# Patient Record
Sex: MALE | Race: BLACK OR AFRICAN AMERICAN | NOT HISPANIC OR LATINO | URBAN - METROPOLITAN AREA
[De-identification: names, ages, dates, MRNs, and addresses within clinical notes are randomized per-mention and may not be internally consistent; named-entity substitution may affect disease eponyms.]

---

## 2024-07-22 RX ORDER — BACTERIOSTATIC SODIUM CHLORIDE 0.9 %
3 VIAL (ML) INJECTION EVERY 8 HOURS
Refills: 0 | Status: DISCONTINUED | OUTPATIENT
Start: 2024-08-06 | End: 2024-08-08

## 2024-07-22 NOTE — CHART NOTE - NSCHARTNOTEFT_GEN_A_CORE
PRE-TJR SOCIAL/FUNCTIONAL SCREENING Via:     In Person Meet  on 7/22/2024:  Preferred Language:  English  Patient Telephone #:  [phone]  EMAIL ADDRESS:  [email or web address]  Insurance:  [employer]  Pt stated Goal for Surgery : Ride my bicycle  SURGERY DETAILS:  Sx Date:  8/6/2024                                                                                           Surgery Type:  Right Hip Replacement  Surgeon:  Dr. Decker     SOCIAL HISTORY:  Live With:   Spouse in a multifamily home on the second floor    Stairs Required to Access (Street to Front Door) Residence:    Yes; 24 with railing on the right side going up  Once Inside Pt Residence:   To Access a Bathroom:      No Stairs Required    To Access a Bedroom Where Pt. Can Sleep:  No Stairs Required      Pt. Currently Has Formal Home Health Services:  No     MOBILITY HISTORY:   Baseline Ambulation- Pt is Independent in ambulation with assistive device:     Yes;   Cane  Pt. Owns Any Other Medical Equipment?  No, patient will need rolling walker and 3-in-1 commode, however MD office ordered the equipment and it should be delivered prior to surgery    MEDICAL HISTORY:  Pt has any History of Back Surgery: No  Pt has any Allergies:  No     Patient denies diagnosis of sleep apnea or use of CPAP    Pt. Pharmacy Information:  Name:  [facility]                  Address:     [address] [place] [address]     Telephone #:  [phone]    Pt. will Require Transportation to Home Upon Discharge:  Yes; Patient is having a hip replacement and will need to maintain hip precautions.   will be Made Aware:    For Post-Acute Care:  Pt. prefers [facility] for post-acute needs    Pt electronically sent pre- op education book "What to do before and after hip replacement".  All details of upcoming procedure discussed including, precautions, the throughput process, post-op process including transportation, home-care and follow-up as well as important information regarding blood clots, pneumonia, falls, infection and pain. Patient given a hip kit including devices to help maintain proper hip precautions (i.e. Long shoe horn, sock aid, reacher, long handled scrub brush)  All questions answered and pt. verbalized understanding.  Pt. has my phone number for follow up as needed.

## 2024-08-06 ENCOUNTER — INPATIENT (INPATIENT)
Facility: HOSPITAL | Age: 64
LOS: 1 days | Discharge: HOME HEALTH | DRG: 554 | End: 2024-08-08
Attending: ORTHOPAEDIC SURGERY | Admitting: ORTHOPAEDIC SURGERY
Payer: MEDICAID

## 2024-08-06 VITALS
WEIGHT: 173.94 LBS | OXYGEN SATURATION: 98 % | RESPIRATION RATE: 16 BRPM | DIASTOLIC BLOOD PRESSURE: 66 MMHG | HEART RATE: 73 BPM | HEIGHT: 65 IN | SYSTOLIC BLOOD PRESSURE: 127 MMHG | TEMPERATURE: 98 F

## 2024-08-06 DIAGNOSIS — M16.11 UNILATERAL PRIMARY OSTEOARTHRITIS, RIGHT HIP: ICD-10-CM

## 2024-08-06 LAB
ANION GAP SERPL CALC-SCNC: 6 MMOL/L — SIGNIFICANT CHANGE UP (ref 5–17)
BLD GP AB SCN SERPL QL: SIGNIFICANT CHANGE UP
BUN SERPL-MCNC: 14 MG/DL — SIGNIFICANT CHANGE UP (ref 7–18)
CALCIUM SERPL-MCNC: 8.3 MG/DL — LOW (ref 8.4–10.5)
CHLORIDE SERPL-SCNC: 107 MMOL/L — SIGNIFICANT CHANGE UP (ref 96–108)
CO2 SERPL-SCNC: 25 MMOL/L — SIGNIFICANT CHANGE UP (ref 22–31)
CREAT SERPL-MCNC: 0.91 MG/DL — SIGNIFICANT CHANGE UP (ref 0.5–1.3)
EGFR: 94 ML/MIN/1.73M2 — SIGNIFICANT CHANGE UP
GLUCOSE BLDC GLUCOMTR-MCNC: 88 MG/DL — SIGNIFICANT CHANGE UP (ref 70–99)
GLUCOSE BLDC GLUCOMTR-MCNC: 97 MG/DL — SIGNIFICANT CHANGE UP (ref 70–99)
GLUCOSE SERPL-MCNC: 91 MG/DL — SIGNIFICANT CHANGE UP (ref 70–99)
HCT VFR BLD CALC: 38.9 % — LOW (ref 39–50)
HGB BLD-MCNC: 12.8 G/DL — LOW (ref 13–17)
MCHC RBC-ENTMCNC: 28.6 PG — SIGNIFICANT CHANGE UP (ref 27–34)
MCHC RBC-ENTMCNC: 32.9 GM/DL — SIGNIFICANT CHANGE UP (ref 32–36)
MCV RBC AUTO: 87 FL — SIGNIFICANT CHANGE UP (ref 80–100)
NRBC # BLD: 0 /100 WBCS — SIGNIFICANT CHANGE UP (ref 0–0)
PLATELET # BLD AUTO: 241 K/UL — SIGNIFICANT CHANGE UP (ref 150–400)
POTASSIUM SERPL-MCNC: 3.9 MMOL/L — SIGNIFICANT CHANGE UP (ref 3.5–5.3)
POTASSIUM SERPL-SCNC: 3.9 MMOL/L — SIGNIFICANT CHANGE UP (ref 3.5–5.3)
RBC # BLD: 4.47 M/UL — SIGNIFICANT CHANGE UP (ref 4.2–5.8)
RBC # FLD: 13.6 % — SIGNIFICANT CHANGE UP (ref 10.3–14.5)
SODIUM SERPL-SCNC: 138 MMOL/L — SIGNIFICANT CHANGE UP (ref 135–145)
WBC # BLD: 9.41 K/UL — SIGNIFICANT CHANGE UP (ref 3.8–10.5)
WBC # FLD AUTO: 9.41 K/UL — SIGNIFICANT CHANGE UP (ref 3.8–10.5)

## 2024-08-06 PROCEDURE — 72170 X-RAY EXAM OF PELVIS: CPT | Mod: 26

## 2024-08-06 PROCEDURE — 27130 TOTAL HIP ARTHROPLASTY: CPT | Mod: AS,RT

## 2024-08-06 PROCEDURE — 27066 REMOVE HIP BONE LES DEEP: CPT | Mod: AS,59,RT

## 2024-08-06 PROCEDURE — 88311 DECALCIFY TISSUE: CPT | Mod: 26

## 2024-08-06 PROCEDURE — 64712 REVISION OF SCIATIC NERVE: CPT | Mod: AS,59,RT

## 2024-08-06 PROCEDURE — 88305 TISSUE EXAM BY PATHOLOGIST: CPT | Mod: 26

## 2024-08-06 DEVICE — LINER ACET TRIDENT X3 10 DEG 36MM E: Type: IMPLANTABLE DEVICE | Status: FUNCTIONAL

## 2024-08-06 DEVICE — CERAMIC HEAD: Type: IMPLANTABLE DEVICE | Status: FUNCTIONAL

## 2024-08-06 DEVICE — CABLE/SLV SET BEAD MED CABLE 2MM: Type: IMPLANTABLE DEVICE | Status: FUNCTIONAL

## 2024-08-06 DEVICE — IMPLANTABLE DEVICE: Type: IMPLANTABLE DEVICE | Status: FUNCTIONAL

## 2024-08-06 DEVICE — SHELL ACET TRIDENT II PLUS CLUSTERHOLE HA 52MM A: Type: IMPLANTABLE DEVICE | Status: FUNCTIONAL

## 2024-08-06 RX ORDER — CHLORHEXIDINE GLUCONATE 500 MG/1
1 CLOTH TOPICAL ONCE
Refills: 0 | Status: COMPLETED | OUTPATIENT
Start: 2024-08-06 | End: 2024-08-06

## 2024-08-06 RX ORDER — CELECOXIB 200 MG/1
200 CAPSULE ORAL ONCE
Refills: 0 | Status: COMPLETED | OUTPATIENT
Start: 2024-08-06 | End: 2024-08-06

## 2024-08-06 RX ORDER — GABAPENTIN 400 MG/1
1 CAPSULE ORAL
Qty: 0 | Refills: 0 | DISCHARGE

## 2024-08-06 RX ORDER — SENNOSIDES 8.6 MG/1
2 TABLET ORAL AT BEDTIME
Refills: 0 | Status: DISCONTINUED | OUTPATIENT
Start: 2024-08-06 | End: 2024-08-08

## 2024-08-06 RX ORDER — OXYCODONE HYDROCHLORIDE 30 MG/1
5 TABLET ORAL EVERY 4 HOURS
Refills: 0 | Status: DISCONTINUED | OUTPATIENT
Start: 2024-08-06 | End: 2024-08-08

## 2024-08-06 RX ORDER — BACTERIOSTATIC SODIUM CHLORIDE 0.9 %
1000 VIAL (ML) INJECTION
Refills: 0 | Status: DISCONTINUED | OUTPATIENT
Start: 2024-08-06 | End: 2024-08-08

## 2024-08-06 RX ORDER — LORATADINE 10 MG
17 TABLET,DISINTEGRATING ORAL AT BEDTIME
Refills: 0 | Status: DISCONTINUED | OUTPATIENT
Start: 2024-08-06 | End: 2024-08-08

## 2024-08-06 RX ORDER — KETOROLAC TROMETHAMINE 10 MG
15 TABLET ORAL EVERY 6 HOURS
Refills: 0 | Status: DISCONTINUED | OUTPATIENT
Start: 2024-08-06 | End: 2024-08-08

## 2024-08-06 RX ORDER — LORATADINE 10 MG
17 TABLET,DISINTEGRATING ORAL
Qty: 0 | Refills: 0 | DISCHARGE

## 2024-08-06 RX ORDER — OXYCODONE AND ACETAMINOPHEN 10; 325 MG/1; MG/1
1 TABLET ORAL
Qty: 0 | Refills: 0 | DISCHARGE

## 2024-08-06 RX ORDER — MAGNESIUM HYDROXIDE 400 MG/5ML
30 SUSPENSION, ORAL (FINAL DOSE FORM) ORAL DAILY
Refills: 0 | Status: DISCONTINUED | OUTPATIENT
Start: 2024-08-06 | End: 2024-08-08

## 2024-08-06 RX ORDER — ACETAMINOPHEN 500 MG
975 TABLET ORAL ONCE
Refills: 0 | Status: COMPLETED | OUTPATIENT
Start: 2024-08-06 | End: 2024-08-06

## 2024-08-06 RX ORDER — ONDANSETRON HCL/PF 4 MG/2 ML
4 VIAL (ML) INJECTION EVERY 6 HOURS
Refills: 0 | Status: DISCONTINUED | OUTPATIENT
Start: 2024-08-06 | End: 2024-08-08

## 2024-08-06 RX ORDER — RIVAROXABAN 15 MG-20MG
1 KIT ORAL
Qty: 0 | Refills: 0 | DISCHARGE

## 2024-08-06 RX ORDER — ENOXAPARIN SODIUM 120 MG/.8ML
30 INJECTION SUBCUTANEOUS EVERY 12 HOURS
Refills: 0 | Status: DISCONTINUED | OUTPATIENT
Start: 2024-08-07 | End: 2024-08-08

## 2024-08-06 RX ORDER — METOCLOPRAMIDE HCL 5 MG/ML
10 VIAL (ML) INJECTION ONCE
Refills: 0 | Status: DISCONTINUED | OUTPATIENT
Start: 2024-08-06 | End: 2024-08-06

## 2024-08-06 RX ORDER — TRAMADOL HCL 50 MG
50 TABLET ORAL EVERY 8 HOURS
Refills: 0 | Status: DISCONTINUED | OUTPATIENT
Start: 2024-08-06 | End: 2024-08-08

## 2024-08-06 RX ORDER — FENTANYL CITRATE 1200 UG/1
25 LOZENGE ORAL; TRANSMUCOSAL
Refills: 0 | Status: DISCONTINUED | OUTPATIENT
Start: 2024-08-06 | End: 2024-08-06

## 2024-08-06 RX ORDER — SENNOSIDES 8.6 MG/1
1 TABLET ORAL
Qty: 0 | Refills: 0 | DISCHARGE

## 2024-08-06 RX ORDER — PANTOPRAZOLE SODIUM 20 MG/1
1 TABLET, DELAYED RELEASE ORAL
Qty: 0 | Refills: 0 | DISCHARGE

## 2024-08-06 RX ORDER — FENTANYL CITRATE 1200 UG/1
50 LOZENGE ORAL; TRANSMUCOSAL
Refills: 0 | Status: DISCONTINUED | OUTPATIENT
Start: 2024-08-06 | End: 2024-08-06

## 2024-08-06 RX ORDER — ROSUVASTATIN CALCIUM 10 MG
1 TABLET ORAL
Refills: 0 | DISCHARGE

## 2024-08-06 RX ORDER — CEFAZOLIN SODIUM 10 G
2000 VIAL (EA) INJECTION EVERY 8 HOURS
Refills: 0 | Status: COMPLETED | OUTPATIENT
Start: 2024-08-06 | End: 2024-08-07

## 2024-08-06 RX ORDER — ACETAMINOPHEN 500 MG
1000 TABLET ORAL EVERY 6 HOURS
Refills: 0 | Status: DISCONTINUED | OUTPATIENT
Start: 2024-08-06 | End: 2024-08-08

## 2024-08-06 RX ORDER — CELECOXIB 200 MG/1
200 CAPSULE ORAL DAILY
Refills: 0 | Status: DISCONTINUED | OUTPATIENT
Start: 2024-08-07 | End: 2024-08-08

## 2024-08-06 RX ORDER — CELECOXIB 200 MG/1
1 CAPSULE ORAL
Qty: 0 | Refills: 0 | DISCHARGE

## 2024-08-06 RX ORDER — ASPIRIN 500 MG
1 TABLET ORAL
Qty: 0 | Refills: 0 | DISCHARGE

## 2024-08-06 RX ORDER — TRAMADOL HCL 50 MG
50 TABLET ORAL ONCE
Refills: 0 | Status: DISCONTINUED | OUTPATIENT
Start: 2024-08-06 | End: 2024-08-06

## 2024-08-06 RX ADMIN — Medication 15 MILLIGRAM(S): at 23:01

## 2024-08-06 RX ADMIN — CHLORHEXIDINE GLUCONATE 1 APPLICATION(S): 500 CLOTH TOPICAL at 11:37

## 2024-08-06 RX ADMIN — Medication 50 MILLIGRAM(S): at 11:37

## 2024-08-06 RX ADMIN — Medication 1000 MILLIGRAM(S): at 21:25

## 2024-08-06 RX ADMIN — Medication 1000 MILLIGRAM(S): at 22:25

## 2024-08-06 RX ADMIN — Medication 50 MILLIGRAM(S): at 22:25

## 2024-08-06 RX ADMIN — Medication 100 MILLIGRAM(S): at 23:16

## 2024-08-06 RX ADMIN — Medication 17 GRAM(S): at 21:26

## 2024-08-06 RX ADMIN — Medication 3 MILLILITER(S): at 21:35

## 2024-08-06 RX ADMIN — SENNOSIDES 2 TABLET(S): 8.6 TABLET ORAL at 21:25

## 2024-08-06 RX ADMIN — Medication 50 MILLIGRAM(S): at 21:25

## 2024-08-06 RX ADMIN — Medication 15 MILLIGRAM(S): at 23:20

## 2024-08-06 RX ADMIN — Medication 975 MILLIGRAM(S): at 11:37

## 2024-08-06 RX ADMIN — CELECOXIB 200 MILLIGRAM(S): 200 CAPSULE ORAL at 11:37

## 2024-08-06 NOTE — DISCHARGE NOTE PROVIDER - NSDCMRMEDTOKEN_GEN_ALL_CORE_FT
aspirin 325 mg oral tablet: 1 tab(s) orally every 12 hours Take after you complete 12 days of Xarelto  CeleBREX 200 mg oral capsule: 1 cap(s) orally once a day  ferrous fumarate 325 mg (106 mg elemental iron) oral tablet: 1 tab(s) orally every 12 hours  gabapentin 100 mg oral tablet: 1 tab(s) orally every 12 hours  meloxicam 7.5 mg oral tablet: 1 tab(s) orally once a day as needed for  severe pain  metFORMIN 500 mg oral tablet: 1 tab(s) orally once a day  pantoprazole 40 mg oral delayed release tablet: 1 tab(s) orally once a day  Percocet 7.5/325 oral tablet: 1 tab(s) orally every 6 hours as needed for  severe pain  polyethylene glycol 3350 oral powder for reconstitution: 17 gram(s) orally once a day as needed for  constipation  rosuvastatin 20 mg oral capsule: 1 cap(s) orally once a day  senna (sennosides) 8.6 mg oral tablet: 1 tab(s) orally every 12 hours as needed for  constipation  Vitamin D3 50,000 units PO once a week: patient has never started to take it  Xarelto 10 mg oral tablet: 1 tab(s) orally once a day   aspirin 325 mg oral tablet: 1 tab(s) orally every 12 hours Take after you complete 12 days of Xarelto  CeleBREX 200 mg oral capsule: 1 cap(s) orally once a day  ferrous fumarate 325 mg (106 mg elemental iron) oral tablet: 1 tab(s) orally every 12 hours  gabapentin 100 mg oral tablet: 1 tab(s) orally every 12 hours  metFORMIN 500 mg oral tablet: 1 tab(s) orally once a day  pantoprazole 40 mg oral delayed release tablet: 1 tab(s) orally once a day  Percocet 7.5/325 oral tablet: 1 tab(s) orally every 6 hours as needed for  severe pain  polyethylene glycol 3350 oral powder for reconstitution: 17 gram(s) orally once a day as needed for  constipation  rosuvastatin 20 mg oral capsule: 1 cap(s) orally once a day  senna (sennosides) 8.6 mg oral tablet: 1 tab(s) orally every 12 hours as needed for  constipation  Vitamin D3 50,000 units PO once a week: patient has never started to take it  Xarelto 10 mg oral tablet: 1 tab(s) orally once a day

## 2024-08-06 NOTE — DISCHARGE NOTE PROVIDER - HOSPITAL COURSE
Patient is a 63y/o M s/p Right total hip replacement. Patient with no acute post op complications. Patient received physical therapy and pain management throughout hospital stay.

## 2024-08-06 NOTE — DISCHARGE NOTE PROVIDER - CARE PROVIDER_API CALL
Renan Decker  Orthopaedic Surgery  [address] [place] [address], [address] [place] [address]  Phone: [phone]  Fax: [phone]  Follow Up Time: 2 weeks

## 2024-08-06 NOTE — DISCHARGE NOTE PROVIDER - NSDCCPTREATMENT_GEN_ALL_CORE_FT
PRINCIPAL PROCEDURE  Procedure: Right hip replacement  Findings and Treatment: Pain Management- *See Attached Medication Reconciliation  *Posterior Hip Precautions for Total hip replacement***  ** >Keep your toes pointing forward or slightly out. Don't rotate your leg too far to the inside.              >Do not bend your hip more than 90 degrees.              >Keep your knees apart. Don't cross your legs.              >Don't lift your knee higher than your hip.              >Don't sit on low chairs, beds, or toilets.   Weight Bearing Status:  WBAT to RLE with walker  Equipment needs: Commode, Walker  Dressing: Please keep bandage/dressing Clean, Dry, and Intact.  Mepilex dressing to be removed only if not in good condition or when staples are to be removed. If mepilex dressing is violated, change with dressing every 2- 3 days with 4x4, abd pads, and ACE bandage.   Dvt prophylaxis: Take Xarelto 10 mg for 12 days then start Aspirin 325mg BID for 15 days  Incision site: NURSING to remove staples on 08/21/24  PT/Occupational Therapy are Activities of Daily Living as appropriate  Follow up with Dr. Decker in 2 WEEKS at [phone] after STAPLES ARE REMOVED   If patient has drainage from the wound, please contact the surgeon's office.   If patient has intractable pain, please contact the surgeon's office.   If excessively warm at the incision site is noted, please contact the surgeon's office.   If redness is noted, especially spreading, please contact the surgeon's office.   If patient has fever over 101F please return to the hospital through the Emergency Room.   If tita blood is saturating the dressing, please return to the hospital through the Emergency Room.  If drainage of fluid or pus is noted, please return to the hospital through the Emergency Room.

## 2024-08-06 NOTE — PROCEDURAL SAFETY CHECKLIST WITH OR WITHOUT SEDATION - NSRESOLVEDISCREP_GEN_ALL_CORE
Continue present medications.    Remain active as tolerated and monitor blood pressure regularly.  · Orthostatic blood pressure assessment in our office today was negative  · Please take BP PRIOR to any medication in AM for the next 2 days - call with results  · I am considering resuming carvedilol at a lesser dose of 3.125 mg twice daily     Call us at [phone], if:   Instructions are unclear  or   You have any questions/ concerns  or    1 week after tests, if you have not received your test results.    Follow up in 4-6 weeks or sooner if needed.      
done

## 2024-08-06 NOTE — PATIENT PROFILE ADULT - FALL HARM RISK - HARM RISK INTERVENTIONS

## 2024-08-06 NOTE — DISCHARGE NOTE PROVIDER - NSDCCPCAREPLAN_GEN_ALL_CORE_FT
PRINCIPAL DISCHARGE DIAGNOSIS  Diagnosis: Unilateral primary osteoarthritis, right hip  Assessment and Plan of Treatment: Pain Management- *See Attached Medication Reconciliation  *Posterior Hip Precautions for Total hip replacement***  ** >Keep your toes pointing forward or slightly out. Don't rotate your leg too far to the inside.              >Do not bend your hip more than 90 degrees.              >Keep your knees apart. Don't cross your legs.              >Don't lift your knee higher than your hip.              >Don't sit on low chairs, beds, or toilets.   Weight Bearing Status:  WBAT to RLE with walker  Equipment needs: Commode, Walker  Dressing: Please keep bandage/dressing Clean, Dry, and Intact.  Mepilex dressing to be removed only if not in good condition or when staples are to be removed. If mepilex dressing is violated, change with dressing every 2- 3 days with 4x4, abd pads, and ACE bandage.   Dvt prophylaxis: Take Xarelto 10 mg for 12 days then start Aspirin 325mg BID for 15 days  Incision site: NURSING to remove staples on 08/21/24  PT/Occupational Therapy are Activities of Daily Living as appropriate  Follow up with Dr. Decker in 2 WEEKS at [phone] after BETSY ARE REMOVED

## 2024-08-07 LAB
ANION GAP SERPL CALC-SCNC: 7 MMOL/L — SIGNIFICANT CHANGE UP (ref 5–17)
BUN SERPL-MCNC: 18 MG/DL — SIGNIFICANT CHANGE UP (ref 7–18)
CALCIUM SERPL-MCNC: 8 MG/DL — LOW (ref 8.4–10.5)
CHLORIDE SERPL-SCNC: 106 MMOL/L — SIGNIFICANT CHANGE UP (ref 96–108)
CO2 SERPL-SCNC: 24 MMOL/L — SIGNIFICANT CHANGE UP (ref 22–31)
CREAT SERPL-MCNC: 0.85 MG/DL — SIGNIFICANT CHANGE UP (ref 0.5–1.3)
EGFR: 97 ML/MIN/1.73M2 — SIGNIFICANT CHANGE UP
GLUCOSE SERPL-MCNC: 117 MG/DL — HIGH (ref 70–99)
HCT VFR BLD CALC: 34.5 % — LOW (ref 39–50)
HGB BLD-MCNC: 11.6 G/DL — LOW (ref 13–17)
MCHC RBC-ENTMCNC: 29 PG — SIGNIFICANT CHANGE UP (ref 27–34)
MCHC RBC-ENTMCNC: 33.6 GM/DL — SIGNIFICANT CHANGE UP (ref 32–36)
MCV RBC AUTO: 86.3 FL — SIGNIFICANT CHANGE UP (ref 80–100)
NRBC # BLD: 0 /100 WBCS — SIGNIFICANT CHANGE UP (ref 0–0)
PLATELET # BLD AUTO: 220 K/UL — SIGNIFICANT CHANGE UP (ref 150–400)
POTASSIUM SERPL-MCNC: 3.8 MMOL/L — SIGNIFICANT CHANGE UP (ref 3.5–5.3)
POTASSIUM SERPL-SCNC: 3.8 MMOL/L — SIGNIFICANT CHANGE UP (ref 3.5–5.3)
RBC # BLD: 4 M/UL — LOW (ref 4.2–5.8)
RBC # FLD: 13.6 % — SIGNIFICANT CHANGE UP (ref 10.3–14.5)
SODIUM SERPL-SCNC: 137 MMOL/L — SIGNIFICANT CHANGE UP (ref 135–145)
WBC # BLD: 9.54 K/UL — SIGNIFICANT CHANGE UP (ref 3.8–10.5)
WBC # FLD AUTO: 9.54 K/UL — SIGNIFICANT CHANGE UP (ref 3.8–10.5)

## 2024-08-07 RX ADMIN — Medication 50 MILLIGRAM(S): at 05:08

## 2024-08-07 RX ADMIN — Medication 1000 MILLIGRAM(S): at 05:08

## 2024-08-07 RX ADMIN — SENNOSIDES 2 TABLET(S): 8.6 TABLET ORAL at 21:16

## 2024-08-07 RX ADMIN — Medication 15 MILLIGRAM(S): at 12:30

## 2024-08-07 RX ADMIN — Medication 50 MILLIGRAM(S): at 22:17

## 2024-08-07 RX ADMIN — Medication 50 MILLIGRAM(S): at 06:08

## 2024-08-07 RX ADMIN — Medication 3 MILLILITER(S): at 21:52

## 2024-08-07 RX ADMIN — Medication 3 MILLILITER(S): at 05:43

## 2024-08-07 RX ADMIN — Medication 1000 MILLIGRAM(S): at 11:59

## 2024-08-07 RX ADMIN — Medication 100 MILLIGRAM(S): at 06:23

## 2024-08-07 RX ADMIN — Medication 50 MILLIGRAM(S): at 21:17

## 2024-08-07 RX ADMIN — Medication 17 GRAM(S): at 21:16

## 2024-08-07 RX ADMIN — CELECOXIB 200 MILLIGRAM(S): 200 CAPSULE ORAL at 12:30

## 2024-08-07 RX ADMIN — CELECOXIB 200 MILLIGRAM(S): 200 CAPSULE ORAL at 11:59

## 2024-08-07 RX ADMIN — Medication 50 MILLIGRAM(S): at 17:22

## 2024-08-07 RX ADMIN — Medication 3 MILLILITER(S): at 13:43

## 2024-08-07 RX ADMIN — Medication 4 MILLIGRAM(S): at 11:59

## 2024-08-07 RX ADMIN — Medication 1000 MILLIGRAM(S): at 12:30

## 2024-08-07 RX ADMIN — ENOXAPARIN SODIUM 30 MILLIGRAM(S): 120 INJECTION SUBCUTANEOUS at 17:21

## 2024-08-07 RX ADMIN — Medication 1000 MILLIGRAM(S): at 17:22

## 2024-08-07 RX ADMIN — Medication 15 MILLIGRAM(S): at 11:59

## 2024-08-07 RX ADMIN — Medication 1000 MILLIGRAM(S): at 06:08

## 2024-08-07 RX ADMIN — ENOXAPARIN SODIUM 30 MILLIGRAM(S): 120 INJECTION SUBCUTANEOUS at 02:05

## 2024-08-07 NOTE — PHYSICAL THERAPY INITIAL EVALUATION ADULT - GENERAL OBSERVATIONS, REHAB EVAL
male pt, post late case R THR, hip precautions, abd pillow in use, patient assisted with bedside gait and transfers training RLE WBAT and oob to chair, RN rounding medicated patients

## 2024-08-07 NOTE — PHYSICAL THERAPY INITIAL EVALUATION ADULT - IMPAIRMENTS FOUND, PT EVAL
aerobic capacity/endurance/gait, locomotion, and balance/gross motor/joint integrity and mobility/muscle strength/posture/ROM

## 2024-08-07 NOTE — PHYSICAL THERAPY INITIAL EVALUATION ADULT - IMPAIRMENTS CONTRIBUTING TO GAIT DEVIATIONS, PT EVAL
follow up medication done by RN/decreased flexibility/impaired motor control/pain/decreased strength

## 2024-08-07 NOTE — PHYSICAL THERAPY INITIAL EVALUATION ADULT - RANGE OF MOTION EXAMINATION, REHAB EVAL
RLE ankle/knee WFL and hip 0-90 flexion/bilateral upper extremity ROM was WNL (within normal limits)/Left LE ROM was WNL (within normal limits)

## 2024-08-07 NOTE — DISCHARGE NOTE NURSING/CASE MANAGEMENT/SOCIAL WORK - PATIENT PORTAL LINK FT
You can access the FollowMyHealth Patient Portal offered by [facility] by registering at the following website: [email or web address]. By joining [employer]’s FollowMyHealth portal, you will also be able to view your health information using other applications (apps) compatible with our system.

## 2024-08-08 VITALS
SYSTOLIC BLOOD PRESSURE: 133 MMHG | OXYGEN SATURATION: 95 % | DIASTOLIC BLOOD PRESSURE: 78 MMHG | RESPIRATION RATE: 18 BRPM | TEMPERATURE: 98 F | HEART RATE: 87 BPM

## 2024-08-08 DIAGNOSIS — Z96.641 PRESENCE OF RIGHT ARTIFICIAL HIP JOINT: ICD-10-CM

## 2024-08-08 DIAGNOSIS — G89.18 OTHER ACUTE POSTPROCEDURAL PAIN: ICD-10-CM

## 2024-08-08 LAB
ANION GAP SERPL CALC-SCNC: 8 MMOL/L — SIGNIFICANT CHANGE UP (ref 5–17)
BUN SERPL-MCNC: 13 MG/DL — SIGNIFICANT CHANGE UP (ref 7–18)
CALCIUM SERPL-MCNC: 8.3 MG/DL — LOW (ref 8.4–10.5)
CHLORIDE SERPL-SCNC: 106 MMOL/L — SIGNIFICANT CHANGE UP (ref 96–108)
CO2 SERPL-SCNC: 24 MMOL/L — SIGNIFICANT CHANGE UP (ref 22–31)
CREAT SERPL-MCNC: 0.78 MG/DL — SIGNIFICANT CHANGE UP (ref 0.5–1.3)
EGFR: 100 ML/MIN/1.73M2 — SIGNIFICANT CHANGE UP
GLUCOSE SERPL-MCNC: 97 MG/DL — SIGNIFICANT CHANGE UP (ref 70–99)
HCT VFR BLD CALC: 32.4 % — LOW (ref 39–50)
HGB BLD-MCNC: 10.9 G/DL — LOW (ref 13–17)
MCHC RBC-ENTMCNC: 29.2 PG — SIGNIFICANT CHANGE UP (ref 27–34)
MCHC RBC-ENTMCNC: 33.6 GM/DL — SIGNIFICANT CHANGE UP (ref 32–36)
MCV RBC AUTO: 86.9 FL — SIGNIFICANT CHANGE UP (ref 80–100)
NRBC # BLD: 0 /100 WBCS — SIGNIFICANT CHANGE UP (ref 0–0)
PLATELET # BLD AUTO: 214 K/UL — SIGNIFICANT CHANGE UP (ref 150–400)
POTASSIUM SERPL-MCNC: 3.6 MMOL/L — SIGNIFICANT CHANGE UP (ref 3.5–5.3)
POTASSIUM SERPL-SCNC: 3.6 MMOL/L — SIGNIFICANT CHANGE UP (ref 3.5–5.3)
RBC # BLD: 3.73 M/UL — LOW (ref 4.2–5.8)
RBC # FLD: 13.9 % — SIGNIFICANT CHANGE UP (ref 10.3–14.5)
SODIUM SERPL-SCNC: 138 MMOL/L — SIGNIFICANT CHANGE UP (ref 135–145)
WBC # BLD: 10.1 K/UL — SIGNIFICANT CHANGE UP (ref 3.8–10.5)
WBC # FLD AUTO: 10.1 K/UL — SIGNIFICANT CHANGE UP (ref 3.8–10.5)

## 2024-08-08 PROCEDURE — 99222 1ST HOSP IP/OBS MODERATE 55: CPT

## 2024-08-08 PROCEDURE — C1776: CPT

## 2024-08-08 PROCEDURE — 36415 COLL VENOUS BLD VENIPUNCTURE: CPT

## 2024-08-08 PROCEDURE — 82962 GLUCOSE BLOOD TEST: CPT

## 2024-08-08 PROCEDURE — 97161 PT EVAL LOW COMPLEX 20 MIN: CPT

## 2024-08-08 PROCEDURE — 72170 X-RAY EXAM OF PELVIS: CPT

## 2024-08-08 PROCEDURE — 88305 TISSUE EXAM BY PATHOLOGIST: CPT

## 2024-08-08 PROCEDURE — 86900 BLOOD TYPING SEROLOGIC ABO: CPT

## 2024-08-08 PROCEDURE — 86850 RBC ANTIBODY SCREEN: CPT

## 2024-08-08 PROCEDURE — 80048 BASIC METABOLIC PNL TOTAL CA: CPT

## 2024-08-08 PROCEDURE — C1889: CPT

## 2024-08-08 PROCEDURE — 86901 BLOOD TYPING SEROLOGIC RH(D): CPT

## 2024-08-08 PROCEDURE — 88311 DECALCIFY TISSUE: CPT

## 2024-08-08 PROCEDURE — 97116 GAIT TRAINING THERAPY: CPT

## 2024-08-08 PROCEDURE — 85027 COMPLETE CBC AUTOMATED: CPT

## 2024-08-08 RX ADMIN — Medication 1000 MILLIGRAM(S): at 06:15

## 2024-08-08 RX ADMIN — ENOXAPARIN SODIUM 30 MILLIGRAM(S): 120 INJECTION SUBCUTANEOUS at 02:09

## 2024-08-08 RX ADMIN — Medication 50 MILLIGRAM(S): at 14:18

## 2024-08-08 RX ADMIN — CELECOXIB 200 MILLIGRAM(S): 200 CAPSULE ORAL at 12:11

## 2024-08-08 RX ADMIN — Medication 3 MILLILITER(S): at 05:34

## 2024-08-08 RX ADMIN — Medication 1000 MILLIGRAM(S): at 05:28

## 2024-08-08 RX ADMIN — Medication 1000 MILLIGRAM(S): at 07:49

## 2024-08-08 RX ADMIN — Medication 50 MILLIGRAM(S): at 07:49

## 2024-08-08 RX ADMIN — Medication 50 MILLIGRAM(S): at 05:28

## 2024-08-08 RX ADMIN — Medication 50 MILLIGRAM(S): at 06:15

## 2024-08-08 NOTE — CONSULT NOTE ADULT - PROBLEM SELECTOR RECOMMENDATION 9
Pt with acute right hip pain which is somatic in nature due to right THR on 8/6 POD #2.   Opioid pain recommendations   - Continue Tramadol 50mg PO q 8 hours. Monitor for sedation/ respiratory depression.   - Continue Oxycodone 5 mg PO q 4 hours PRN moderate pain. Monitor for sedation/ respiratory depression.   Non-opioid pain recommendations   - Continue Toradol 15mg IVP q 6 hours PRN severe pain  - Continue Acetaminophen 1 gram PO q 6 hours for 24 hours only. Monitor LFTs  - Continue Celebrex 200mg PO daily  Bowel Regimen  - Continue Miralax 17G PO daily  - Continue Senna 2 tablets at bedtime for constipation  Mild pain   - Non-pharmacological pain treatment recommendations  - Warm/ Cool packs PRN   - Repositioning extremity, elevation, imagery, relaxation, distraction.  - Physical therapy OOB if no contraindications   Recommendations discussed with primary team and RN.  Upon discharge – dc on Celebrex 200mg po daily and Percocet 5/325mg po q 6 hours prn for 1 week. Pt to take OTC stool softeners

## 2024-08-08 NOTE — CONSULT NOTE ADULT - SUBJECTIVE AND OBJECTIVE BOX
Source of information: RONALD PAGETT, Chart review  Patient language: English  : n/a    HPI:  63 yo male with history of HLD, Prediabetes (patient was given medication for both conditions but he does not the medications), reports the above.  Patient states it just happened that two years ago he started with some pain in the right hip, his right lower extremity would give out when he is walking, then at approximately one year ago, the right lower extremity appears shorter than the left and it started limping since.  Patient wants to have the surgery to "start walking good again". He is scheduled for :  Right Total Hip Replacement, on 8/5/24 (22 Jul 2024 07:29)    Pt is admitted for scheduled right THR on 8/6 POD #2. Pain consulted for postop pain. Pt seen and examined at bedside. Reports right hip pain score 4/10 and tolerable SCALE USED: (1-10 VNRS). Pt describes pain as aching, non- radiating, alleviated by pain medication and rest, exacerbated by movement. Denies numbness/ tingling. Pt tolerating PO diet. Denies lethargy, chest pain, SOB, nausea, vomiting, constipation. Reports passing flatus postop. Patient stated goal for pain control: to be able to take deep breaths, get out of bed to chair and ambulate with tolerable pain control. Ambulated with PT with tolerable pain. Plan to be discharged home today.      PAST MEDICAL & SURGICAL HISTORY:  Prediabetes      Hyperlipidemia          FAMILY HISTORY:  FH: type 2 diabetes (Sibling)        Social History:   [X ] Denies ETOH use, illicit drug use and smoking    Allergies    No Known Allergies    MEDICATIONS  (STANDING):  acetaminophen     Tablet .. 1000 milliGRAM(s) Oral every 6 hours  celecoxib 200 milliGRAM(s) Oral daily  enoxaparin Injectable 30 milliGRAM(s) SubCutaneous every 12 hours  polyethylene glycol 3350 17 Gram(s) Oral at bedtime  senna 2 Tablet(s) Oral at bedtime  sodium chloride 0.9% lock flush 3 milliLiter(s) IV Push every 8 hours  sodium chloride 0.9%. 1000 milliLiter(s) (120 mL/Hr) IV Continuous <Continuous>  traMADol 50 milliGRAM(s) Oral every 8 hours    MEDICATIONS  (PRN):  ketorolac   Injectable 15 milliGRAM(s) IV Push every 6 hours PRN Severe Pain (7 - 10)  magnesium hydroxide Suspension 30 milliLiter(s) Oral daily PRN Constipation  ondansetron Injectable 4 milliGRAM(s) IV Push every 6 hours PRN Nausea and/or Vomiting  oxyCODONE    IR 5 milliGRAM(s) Oral every 4 hours PRN Moderate Pain (4 - 6)      Vital Signs Last 24 Hrs  T(C): 37.2 (08 Aug 2024 05:07), Max: 37.2 (08 Aug 2024 05:07)  T(F): 99 (08 Aug 2024 05:07), Max: 99 (08 Aug 2024 05:07)  HR: 74 (08 Aug 2024 05:07) (74 - 86)  BP: 145/79 (08 Aug 2024 05:07) (128/69 - 145/79)  BP(mean): 89 (07 Aug 2024 20:10) (89 - 89)  RR: 18 (08 Aug 2024 05:07) (16 - 18)  SpO2: 97% (08 Aug 2024 05:07) (95% - 97%)    Parameters below as of 08 Aug 2024 05:07  Patient On (Oxygen Delivery Method): room air        LABS: Reviewed.                          10.9   10.10 )-----------( 214      ( 08 Aug 2024 06:02 )             32.4     08-08    138  |  106  |  13  ----------------------------<  97  3.6   |  24  |  0.78    Ca    8.3<L>      08 Aug 2024 06:02          Urinalysis Basic - ( 08 Aug 2024 06:02 )    Color: x / Appearance: x / SG: x / pH: x  Gluc: 97 mg/dL / Ketone: x  / Bili: x / Urobili: x   Blood: x / Protein: x / Nitrite: x   Leuk Esterase: x / RBC: x / WBC x   Sq Epi: x / Non Sq Epi: x / Bacteria: x    Radiology: Reviewed.   < from: Xray Pelvis AP only (08.06.24 @ 16:43) >    ACC: [id number] EXAM:  XR PELVIS AP ONLY 1-2 VIEWS   ORDERED BY: ROSY SUNG     PROCEDURE DATE:  08/06/2024          INTERPRETATION:  Clinical history: 64-year-old male, postop.    Single view of the pelvis.    FINDINGS: Post right total hip arthroplasty, prosthesis in satisfactory   position.    Mild degenerative change otherwise noted.    IMPRESSION:    Post right total arthroplasty in satisfactory position    --- End of Report ---            KIKI LEZAMA DO; Attending Radiologist  This document has been electronically signed. Aug  6 2024  4:45PM    < end of copied text >    ORT Score -   Family Hx of substance abuse	Female	      Male  Alcohol 	                                           1                     3  Illegal drugs	                                   2                     3  Rx drugs                                           4 	                  4  Personal Hx of substance abuse		  Alcohol 	                                          3	                  3  Illegal drugs                                     4	                  4  Rx drugs                                            5 	                  5  Age between 16- 45 years	           1                     1  hx preadolescent sexual abuse	   3 	                  0  Psychological disease		  ADD, OCD, bipolar, schizophrenia   2	          2  Depression                                           1 	          1  Total: 0    a score of 3 or lower indicates low risk for opioid abuse		  a score of 4-7 indicates moderate risk for opioid abuse		  a score of 8 or higher indicates high risk for opioid abuse  	  REVIEW OF SYSTEMS:  CONSTITUTIONAL: No fever or fatigue  HEENT:  No difficulty hearing, no change in vision  NECK: No pain or stiffness  RESPIRATORY: No cough, wheezing, chills or hemoptysis; No shortness of breath  CARDIOVASCULAR: No chest pain, palpitations, dizziness, or leg swelling  GASTROINTESTINAL: No loss of appetite, decreased PO intake. No abdominal or epigastric pain. No nausea, vomiting; No diarrhea or constipation.   GENITOURINARY: No dysuria, frequency, hematuria, retention or incontinence  MUSCULOSKELETAL: + right hip joint pain and swelling; No back pain, no upper or lower motor strength weakness, no saddle anesthesia, bowel/bladder incontinence, no falls   NEURO: No headaches, No numbness/tingling b/l LE, No weakness    PHYSICAL EXAM:  GENERAL:  Alert & Oriented X4, cooperative, NAD, Good concentration. Speech is clear.   RESPIRATORY: Respirations even and unlabored. Clear to auscultation bilaterally; No rales, rhonchi, wheezing, or rubs  CARDIOVASCULAR: Normal S1/S2, regular rate and rhythm; No murmurs, rubs, or gallops. No JVD.   GASTROINTESTINAL:  Soft, Nontender, Nondistended; Bowel sounds present  PERIPHERAL VASCULAR:  Extremities warm with right hip edema. 2+ Peripheral Pulses, No cyanosis, No calf tenderness  MUSCULOSKELETAL: Motor Strength 5/5 B/L upper and lower extremities; moves all extremities equally against gravity; + decreased ROM right hip, + right hip tenderness on palpation. + hip abductor pillow in place   SKIN: Warm, dry, intact. No rashes, lesions, + right hip dressing c/d/i     Risk factors associated with adverse outcomes related to opioid treatment  [ ]  Concurrent benzodiazepine use  [ ]  History/ Active substance use or alcohol use disorder  [ ] Psychiatric co-morbidity  [ ] Sleep apnea  [ ] COPD  [ ] BMI> 35  [ ] Liver dysfunction  [ ] Renal dysfunction  [ ] CHF  [ ] Smoker  [X ]  Age > 60 years    [X ]  [place]  Reviewed and Copied to Chart. See below.    Plan of care and goal oriented pain management treatment options were discussed with patient and /or primary care giver; all questions and concerns were addressed and care was aligned with patient's wishes.    Educated patient on goal oriented pain management treatment options

## 2024-08-08 NOTE — CONSULT NOTE ADULT - ASSESSMENT
Confidential Drug Utilization Report  Search Terms: Ronald Pagett, 1960Search Date: 08/08/2024 10:02:35 AM  The Drug Utilization Report below displays all of the controlled substance prescriptions, if any, that your patient has filled in the last twelve months. The information displayed on this report is compiled from pharmacy submissions to the Department, and accurately reflects the information as submitted by the pharmacies.    This report was requested by: Hamida Germain | Reference #: [id number]    You have not added a NATE number. Keeping your NATE number(s) up to date on the My NATE # tab will enable the separation of your prescriptions from others in the search results.    Practitioner Count: 1  Pharmacy Count: 1  Current Opioid Prescriptions: 1  Current Benzodiazepine Prescriptions: 0  Current Stimulant Prescriptions: 0      Patient Demographic Information (PDI)       PDI	First Name	Last Name	Birth Date	Gender	Street Address	[facility] Code  A	Ronald	Pagett	1960	Male	[address] [facility]	[address]    Prescription Information      PDI Filter:    PDI	Current Rx	Drug Type	Rx Written	Rx Dispensed	Drug	Quantity	Days Supply	Prescriber Name	Prescriber NATE #	Payment Method	Dispenser  A	Y	O	07/31/2024	08/02/2024	oxycodone-acetaminophen 7.5-325 mg tablet	28	7	Renan Decker MD	[id number]	Medicaid	[facility] A    * - Details of Drug Type : O = Opioid, B = Benzodiazepine, S = Stimulant    * - Drugs marked with an asterisk are compound drugs. If the compound drug is made up of more than one controlled substance, then each controlled substance will be a separate row in the table.

## 2024-08-08 NOTE — PROGRESS NOTE ADULT - SUBJECTIVE AND OBJECTIVE BOX
ALISA [id number]  64yMale    Diagnosis:  64yMaleS/p Right Total Hip Replacement POD# 1    Patient is seen and evaluated at bedside.   Patient with no acute complaints. States he was in 7/10 last night but is now well controlled with medication.  Awaiting PT for ambulation.   Denies CP/SOB, palpitations, dyspnea, paresthesias, N/V    Vital Signs Last 24 Hrs  T(C): 36.8 (07 Aug 2024 05:31), Max: 36.9 (06 Aug 2024 10:51)  T(F): 98.3 (07 Aug 2024 05:31), Max: 98.4 (06 Aug 2024 10:51)  HR: 61 (07 Aug 2024 05:31) (59 - 73)  BP: 147/77 (07 Aug 2024 05:31) (99/67 - 147/77)  BP(mean): 89 (06 Aug 2024 19:58) (70 - 89)  RR: 18 (07 Aug 2024 05:31) (13 - 22)  SpO2: 95% (07 Aug 2024 05:31) (94% - 100%)    Parameters below as of 07 Aug 2024 05:31  Patient On (Oxygen Delivery Method): room air      I&O's Summary    06 Aug 2024 07:01  -  07 Aug 2024 07:00  --------------------------------------------------------  IN: 0 mL / OUT: 450 mL / NET: -450 mL        Physical Exam:  General: AAOx3, in NAD, resting comfortably in bed.  Right Hip:  Dressing is C/D/I. New mepilex dressing placed today. Abduction pillow in place. Skin is pink and warm. Staples intact. No erythema. SILT.  Wound with no drainage, healing well.   Lower extremity:  No calf tenderness, calves are soft. 2+pulses. NVI. 5/5 Strength of EHL/FHL/ADF/APF b/l.  Good capillary refill. Warm, well-perfused.                           11.6   9.54  )-----------( 220      ( 07 Aug 2024 06:45 )             34.5     08-07    137  |  106  |  18  ----------------------------<  117<H>  3.8   |  24  |  0.85    Ca    8.0<L>      07 Aug 2024 06:45        Impression:    64y M S/p Right Total Hip Replacement POD#1  Plan:  -  Pain management  -  DVT prophylaxis with Lovenox and venodynes  -  Daily Physical Therapy:  WBAT on RLE with appropriate assistive device  - *Posterior hip precautions (bedside commode, high chair, abduction pillow between legs when in bed)  -  Discharge planning: Home Vs. Rehab   -  Continue Antibiotics x 24hrs post-op  -  Encouraged use of incentive spirometer  -  Case d/w Dr. Decker      
ALISA [id number]  64yMale    Diagnosis:  64yMaleS/p Right Total Hip Replacement POD#2     Patient is seen and evaluated at bedside.   Patient with no acute complaints.   Pain is mild; well controlled.  Patient admits to being OOB with PT.  Denies CP/SOB, palpitations, dyspnea, paresthesias, N/V    Vital Signs Last 24 Hrs  T(C): 37.2 (08 Aug 2024 05:07), Max: 37.2 (08 Aug 2024 05:07)  T(F): 99 (08 Aug 2024 05:07), Max: 99 (08 Aug 2024 05:07)  HR: 74 (08 Aug 2024 05:07) (74 - 86)  BP: 145/79 (08 Aug 2024 05:07) (128/69 - 145/79)  BP(mean): 89 (07 Aug 2024 20:10) (89 - 89)  RR: 18 (08 Aug 2024 05:07) (16 - 18)  SpO2: 97% (08 Aug 2024 05:07) (95% - 97%)    Parameters below as of 08 Aug 2024 05:07  Patient On (Oxygen Delivery Method): room air      I&O's Summary    07 Aug 2024 07:01  -  08 Aug 2024 07:00  --------------------------------------------------------  IN: 0 mL / OUT: 750 mL / NET: -750 mL        Physical Exam:  General: AAOx3, in NAD, resting comfortably in bed.  Right Hip:  Dressing is C/D/I. Abduction pillow in place. Skin is pink and warm. No erythema. SILT.  Wound with no drainage, healing well.   Lower extremity:  No calf tenderness, calves are soft. 2+pulses. NVI. 5/5 Strength of EHL/FHL/ADF/APF b/l.  Good capillary refill. Warm, well-perfused.                           10.9   10.10 )-----------( 214      ( 08 Aug 2024 06:02 )             32.4     08-08    138  |  106  |  13  ----------------------------<  97  3.6   |  24  |  0.78    Ca    8.3<L>      08 Aug 2024 06:02        Impression:   64yS/p Right Total Hip Replacement POD#2  Plan:  -  Pain management  -  DVT prophylaxis with Lovenox and venodynes  -  Daily Physical Therapy:  WBAT on RLE with appropriate assistive device  - *Posterior hip precautions (bedside commode, high chair, abduction pillow between legs when in bed)  -  Discharge planning: Home  -  Encouraged use of incentive spirometer  -  Case d/w Dr. Decker

## 2024-08-14 LAB — SURGICAL PATHOLOGY STUDY: SIGNIFICANT CHANGE UP

## (undated) DEVICE — POSITIONER FOAM ABDUCTION PILLOW MED (PINK)

## (undated) DEVICE — DRSG MEDIPORE DRESS IT 7-7/8 X 11"

## (undated) DEVICE — SUT HEWSON RETRIEVER

## (undated) DEVICE — WARMING BLANKET UPPER ADULT

## (undated) DEVICE — VENODYNE/SCD SLEEVE CALF MEDIUM

## (undated) DEVICE — PACK TOTAL HIP FH.

## (undated) DEVICE — HOOD FLYTE STRYKER HELMET SHIELD

## (undated) DEVICE — DRAPE 1/2 SHEET 40X57"

## (undated) DEVICE — DRAPE TOWEL BLUE 17" X 24"

## (undated) DEVICE — SYR LUER LOK 20CC

## (undated) DEVICE — MEDICATION LABELS W MARKER

## (undated) DEVICE — SAW BLADE STRYKER RECIPROCATING 77.6X0.77X11.2MM

## (undated) DEVICE — GOWN XXL

## (undated) DEVICE — POSITIONER STIRRUP STRAP W SLIP RING 19X3.5"

## (undated) DEVICE — GLV 8 PROTEXIS (CREAM) MICRO

## (undated) DEVICE — SOL IRR POUR NS 0.9% 1500ML

## (undated) DEVICE — FOLEY TRAY 16FR SURESTEP LTX BG

## (undated) DEVICE — SUT POLYSORB 1 18" UNDYED

## (undated) DEVICE — ELCTR AQUAMANTYS BIPOLAR SEALER 6.0

## (undated) DEVICE — DRSG ADAPTIC 3 X 8"

## (undated) DEVICE — SOL IRR BAG NS 0.9% 3000ML

## (undated) DEVICE — PREP BETADINE KIT

## (undated) DEVICE — SUT POLYSORB 2-0 30" GS-10 UNDYED

## (undated) DEVICE — FOR-ESU VALLEYLAB T7E14830DX: Type: DURABLE MEDICAL EQUIPMENT

## (undated) DEVICE — DRAPE SHOWER CURTAIN ISOLATION

## (undated) DEVICE — DRAPE LIGHT HANDLE COVER (BLUE)

## (undated) DEVICE — SUT TICRON 2 36" GS-21

## (undated) DEVICE — POSITIONER FOAM MATTRESS PAD CONVOLUTED (PINK)

## (undated) DEVICE — ELCTR GROUNDING PAD ADULT COVIDIEN

## (undated) DEVICE — NDL HYPO SAFE 22G X 1.5" (BLACK)

## (undated) DEVICE — GLV 8.5 PROTEXIS (BLUE)

## (undated) DEVICE — STAPLER SKIN PROXIMATE

## (undated) DEVICE — SOL IRR POUR H2O 1500ML